# Patient Record
Sex: FEMALE | Race: WHITE | Employment: OTHER | URBAN - NONMETROPOLITAN AREA
[De-identification: names, ages, dates, MRNs, and addresses within clinical notes are randomized per-mention and may not be internally consistent; named-entity substitution may affect disease eponyms.]

---

## 2025-01-09 ENCOUNTER — LAB (OUTPATIENT)
Dept: LAB | Age: 70
End: 2025-01-09

## 2025-01-09 LAB
ALBUMIN SERPL BCG-MCNC: 3.8 G/DL (ref 3.5–5.1)
ALP SERPL-CCNC: 61 U/L (ref 38–126)
ALT SERPL W/O P-5'-P-CCNC: 20 U/L (ref 11–66)
ANION GAP SERPL CALC-SCNC: 10 MEQ/L (ref 8–16)
AST SERPL-CCNC: 22 U/L (ref 5–40)
BASOPHILS ABSOLUTE: 0.1 THOU/MM3 (ref 0–0.1)
BASOPHILS NFR BLD AUTO: 1 %
BILIRUB SERPL-MCNC: 0.4 MG/DL (ref 0.3–1.2)
BUN SERPL-MCNC: 14 MG/DL (ref 7–22)
CALCIUM SERPL-MCNC: 9.7 MG/DL (ref 8.5–10.5)
CHLORIDE SERPL-SCNC: 105 MEQ/L (ref 98–111)
CO2 SERPL-SCNC: 26 MEQ/L (ref 23–33)
CREAT SERPL-MCNC: 1 MG/DL (ref 0.4–1.2)
DEPRECATED RDW RBC AUTO: 45.6 FL (ref 35–45)
EOSINOPHIL NFR BLD AUTO: 1.5 %
EOSINOPHILS ABSOLUTE: 0.1 THOU/MM3 (ref 0–0.4)
ERYTHROCYTE [DISTWIDTH] IN BLOOD BY AUTOMATED COUNT: 14 % (ref 11.5–14.5)
GFR SERPL CREATININE-BSD FRML MDRD: 60 ML/MIN/1.73M2
GLUCOSE SERPL-MCNC: 93 MG/DL (ref 70–108)
HCT VFR BLD AUTO: 38.1 % (ref 37–47)
HGB BLD-MCNC: 11.9 GM/DL (ref 12–16)
IMM GRANULOCYTES # BLD AUTO: 0.02 THOU/MM3 (ref 0–0.07)
IMM GRANULOCYTES NFR BLD AUTO: 0.3 %
LYMPHOCYTES ABSOLUTE: 2.1 THOU/MM3 (ref 1–4.8)
LYMPHOCYTES NFR BLD AUTO: 31.6 %
MCH RBC QN AUTO: 27.9 PG (ref 26–33)
MCHC RBC AUTO-ENTMCNC: 31.2 GM/DL (ref 32.2–35.5)
MCV RBC AUTO: 89.4 FL (ref 81–99)
MONOCYTES ABSOLUTE: 0.6 THOU/MM3 (ref 0.4–1.3)
MONOCYTES NFR BLD AUTO: 8.4 %
NEUTROPHILS ABSOLUTE: 3.9 THOU/MM3 (ref 1.8–7.7)
NEUTROPHILS NFR BLD AUTO: 57.2 %
NRBC BLD AUTO-RTO: 0 /100 WBC
PLATELET # BLD AUTO: 306 THOU/MM3 (ref 130–400)
PMV BLD AUTO: 10.2 FL (ref 9.4–12.4)
POTASSIUM SERPL-SCNC: 4.3 MEQ/L (ref 3.5–5.2)
PROT SERPL-MCNC: 6.3 G/DL (ref 6.1–8)
RBC # BLD AUTO: 4.26 MILL/MM3 (ref 4.2–5.4)
SODIUM SERPL-SCNC: 141 MEQ/L (ref 135–145)
TSH SERPL DL<=0.005 MIU/L-ACNC: 3.08 UIU/ML (ref 0.4–4.2)
WBC # BLD AUTO: 6.8 THOU/MM3 (ref 4.8–10.8)

## 2025-04-29 ENCOUNTER — TELEPHONE (OUTPATIENT)
Dept: PHARMACY | Facility: CLINIC | Age: 70
End: 2025-04-29

## 2025-04-29 NOTE — TELEPHONE ENCOUNTER
[facility] CLINICAL PHARMACY: ADHERENCE REVIEW  Identified care gap per [place]: fills at [facility]: ACE/ARB adherence    Patient also appears to be prescribed: Statin    ASSESSMENT    ACE/ARB ADHERENCE    Insurance Records claims through 25 (Prior Year PDC = not reported; YTD PDC = FIRST FILL; Potential Fail Date: 25):   LISINOPRIL/HCTZ 20-12.5MG TAB last filled on 25 for 90 day supply. Next refill due: 25    Prescribed si tablet/capsule daily    Per Insurer Portal: last filled on 25 for 90 day supply.     Per [facility]: not contacted  3RR    BP Readings from Last 3 Encounters:   23 139/85   10/14/19 94/66   10/02/19 135/75     CrCl cannot be calculated (Unknown ideal weight.).  Lab Results   Component Value Date    CREATININE 1.0 2025     Lab Results   Component Value Date    K 4.3 2025     STATIN ADHERENCE    Insurance Records claims through 25 (Prior Year PDC = not reported; YTD PDC = FIRST FILL; Potential Fail Date: 25):   ATORVASTATIN 40 MG TABLET last filled on 25 for 90 day supply. Next refill due: 25    Prescribed si tablet/capsule daily    Per Insurer Portal: last filled on 25 for 90 day supply.     Per [facility]: not contacted  3RR    Lab Results   Component Value Date    CHOL 175 2024    TRIG 113 2024    HDL 77 2024     Lab Results   Component Value Date    LDL 75 2024      ALT   Date Value Ref Range Status   2025 20 11 - 66 U/L Final     Comment:     Performed at [facility] [address] [place] [address]     AST   Date Value Ref Range Status   2025 22 5 - 40 U/L Final     The ASCVD Risk score (Alesha PRATER, et al., 2019) failed to calculate for the following reasons:    The systolic blood pressure is missing     PLAN    The following are interventions that have been identified:   Patient OVERDUE refilling LISINOP/HCTZ TAB 20-12.5 and ATORVASTATIN 40 MG TABLET

## 2025-04-29 NOTE — TELEPHONE ENCOUNTER
Patient returned adherence call. She is still taking both medications LISINOPRIL/HCTZ 20-12.5MG TAB  and ATORVASTATIN 40 MG TABLET and would like them both refilled at [facility].She stated she had 2 pills of each left and she was not sure how she ended up with a surplus.  Contacted [facility] they will refill both meds  and have ready for tomorrow along with her metoprolol that was also due.    Jessica Gan CPhT.  Population Health Clinical   [facility]  Toll free: [phone] Option 1     For Pharmacy Admin Tracking Only    Program: Visualtising  CPA in place:  No  Recommendation Provided To: Patient/Caregiver: 1 via Telephone and Pharmacy: 1  Intervention Detail: Adherence Monitorin  Intervention Accepted By: Pharmacy: 2  Gap Closed?: Yes   Time Spent (min): 15

## 2025-06-25 ENCOUNTER — LAB (OUTPATIENT)
Dept: LAB | Age: 70
End: 2025-06-25

## 2025-06-25 LAB
25(OH)D3 SERPL-MCNC: 52 NG/ML (ref 30–100)
FOLATE SERPL-MCNC: 4.9 NG/ML (ref 4.6–34.8)
VIT B12 SERPL-MCNC: > 2000 PG/ML (ref 232–1245)

## 2025-06-28 LAB — PYRIDOXAL PHOS SERPL-SCNC: 216.4 NMOL/L (ref 20–125)
